# Patient Record
Sex: FEMALE | URBAN - METROPOLITAN AREA
[De-identification: names, ages, dates, MRNs, and addresses within clinical notes are randomized per-mention and may not be internally consistent; named-entity substitution may affect disease eponyms.]

---

## 2017-05-03 ENCOUNTER — EMERGENCY (EMERGENCY)
Facility: HOSPITAL | Age: 32
LOS: 1 days | Discharge: HOME | End: 2017-05-03
Attending: EMERGENCY MEDICINE | Admitting: EMERGENCY MEDICINE
Payer: COMMERCIAL

## 2017-05-03 VITALS
DIASTOLIC BLOOD PRESSURE: 88 MMHG | TEMPERATURE: 98 F | RESPIRATION RATE: 18 BRPM | OXYGEN SATURATION: 97 % | SYSTOLIC BLOOD PRESSURE: 126 MMHG | HEART RATE: 80 BPM

## 2017-05-03 DIAGNOSIS — S09.90XA UNSPECIFIED INJURY OF HEAD, INITIAL ENCOUNTER: ICD-10-CM
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
## 2017-05-03 PROBLEM — Z00.00 ENCOUNTER FOR PREVENTIVE HEALTH EXAMINATION: Status: ACTIVE | Noted: 2017-05-03

## 2017-05-03 PROCEDURE — 99283 EMERGENCY DEPT VISIT LOW MDM: CPT | Mod: 25

## 2017-05-03 PROCEDURE — 12011 RPR F/E/E/N/L/M 2.5 CM/<: CPT

## 2017-05-03 PROCEDURE — 90715 TDAP VACCINE 7 YRS/> IM: CPT

## 2017-05-03 PROCEDURE — 90471 IMMUNIZATION ADMIN: CPT | Mod: XU

## 2017-05-03 RX ORDER — TETANUS TOXOID, REDUCED DIPHTHERIA TOXOID AND ACELLULAR PERTUSSIS VACCINE, ADSORBED 5; 2.5; 8; 8; 2.5 [IU]/.5ML; [IU]/.5ML; UG/.5ML; UG/.5ML; UG/.5ML
0.5 SUSPENSION INTRAMUSCULAR ONCE
Qty: 0 | Refills: 0 | Status: COMPLETED | OUTPATIENT
Start: 2017-05-03 | End: 2017-05-03

## 2017-05-03 RX ADMIN — TETANUS TOXOID, REDUCED DIPHTHERIA TOXOID AND ACELLULAR PERTUSSIS VACCINE, ADSORBED 0.5 MILLILITER(S): 5; 2.5; 8; 8; 2.5 SUSPENSION INTRAMUSCULAR at 22:51

## 2017-05-03 NOTE — ED PROVIDER NOTE - CHPI ED SYMPTOMS NEG
no blurred vision/no numbness/no loss of consciousness/no change in level of consciousness/no dizziness/no confusion/no nausea/no vomiting/no weakness

## 2017-05-03 NOTE — ED ADULT NURSE NOTE - CHPI ED SYMPTOMS NEG
no nausea/no loss of consciousness/no vomiting/no fever/no blurred vision/no dizziness/no confusion/no weakness/no numbness/no change in level of consciousness

## 2017-05-03 NOTE — ED PROVIDER NOTE - OBJECTIVE STATEMENT
32 yo prev healthy s/p head injury after collision w  another player during football. No ams, loc, vomiting since injury and ambulatory. With small superficial laceration below L eyebrow. no neck pain or any other external trauma. No amnesia.

## 2017-05-03 NOTE — ED PROVIDER NOTE - MEDICAL DECISION MAKING DETAILS
No redflags for head trauma at this time, neuro exam wnl pt s/p collision with another player during football, no indication for ct at this time neuro exam wnl, mentating well, pe reassuring. Dermabond used for superficial skin lac, approximating well. return prec, concussion instructions given.

## 2017-05-03 NOTE — ED ADULT TRIAGE NOTE - CHIEF COMPLAINT QUOTE
I was playing football with friends and collided with another player.  I have a small cut over my left eye. Tetanus status unknown.

## 2017-05-03 NOTE — ED ADULT NURSE NOTE - OBJECTIVE STATEMENT
Pt presents to ED A&Ox3. pt states she was playing football when another player collided with her, hitting her right eye. right orbit noted with bruising, 2cm laceration to upper eyelid. pt denies LOC, headache, dizziness, n/v/d, visual disturbances.

## 2017-05-03 NOTE — ED PROVIDER NOTE - PHYSICAL EXAMINATION
CONSTITUTIONAL: Well appearing, well nourished, awake, alert and in no apparent distress.  HEENT: Head is atraumatic. Eyes clear bilaterally, normal EOMI. Airway patent. 1.5cm very superficial lac below L eyebrow, approximating, hemostatic. mild periorbital ecchymosis. no pe findings suggestive of basilar skull fx (hemotympanum, raccoon eyes, otorrhea/rhinorrhea, tee's signs), open/depressed skull fx.   CARDIAC: Normal rate, regular rhythm.  Heart sounds S1, S2.   RESPIRATORY: Breath sounds clear and equal bilaterally.  GASTROINTESTINAL: Abdomen soft, non-tender, no guarding.  MUSCULOSKELETAL: Spine appears normal, range of motion is not limited, no muscle or joint tenderness. no midline c/t/l spine tenderness.  NEUROLOGICAL: Alert and oriented, no focal deficits, no motor or sensory deficits.  SKIN: Skin normal color for race, warm, dry and intact. No evidence of rash.  PSYCHIATRIC: Alert and oriented to person, place, time/situation. normal mood and affect. no apparent risk to self or others.

## 2019-12-27 ENCOUNTER — RESULT REVIEW (OUTPATIENT)
Age: 34
End: 2019-12-27

## 2020-12-02 ENCOUNTER — RESULT REVIEW (OUTPATIENT)
Age: 35
End: 2020-12-02

## 2024-02-22 NOTE — ED ADULT NURSE NOTE - NS ED NURSE LEVEL OF CONSCIOUSNESS SPEECH
Date of Last Office Visit: 1/25/24  Date of Next Office Visit: 3/21/24  No shows since last visit: 0  Cancellations since last visit: 0    Medication requested: diazepam (VALIUM) 10 MG tablet  Date last ordered: 11/30/23 Qty: 20 Refills: 0     Review of [place] ?: Yes  Medication last sold date: 12/8/23 Qty filled: 20  Other controlled substance on [place] ?: Yes  If yes, is this a new medication?: No    Lapse in medication adherence greater than 5 days?: No  If yes, call patient and gather details: na  Medication refill request verified as identical to current order?: yes  Result of Last DAM, VPA, Li+ Level, CBC, or Carbamazepine Level (at or since last visit): N/A    Last visit treatment plan: Increase olanzapine/Zyprexa to 5-10 mg daily as needed for anxiety, sleep, agitation, bipolar disorder.  Continue lamotrigine 200 mg daily for mood (can take as split dose 100 mg twice daily if preferred).  Continue Valium/diazepam 5-10 mg daily as needed for severe anxiety/panic symptoms.  Do not take daily.  15 tabs to last 30 days.  1 time prescription given for 20 tablets recently since went on vacation in early January.  Continue gabapentin 600 mg at bedtime.    Continue Lexapro/escitalopram 20 mg daily for mood, anxiety.  Referral placed for ADHD testing    []Medication refilled per  Medication Refill in Ambulatory Care  policy.  [x]Medication unable to be refilled by RN due to criteria not met as indicated below:    []Eligibility - not seen in the last year   []Supervision - no future appointment   []Compliance - no shows, cancellations or lapse in therapy   []Verification - order discrepancy   [x]Controlled medication   []Medication not included in policy   []90-day supply request   []Other  
Speaking Coherently

## 2024-03-14 NOTE — ED ADULT NURSE NOTE - NS ED NOTE ABUSE SUSPICION NEGLECT YN
Treatment Number: 1 Pulse Duration: 36 Wavelength: 532 nm Spot Size In Mm: 1.0 mm Pulse Duration: 5 Consent: Written consent obtained, risks reviewed including but not limited to crusting, scabbing, blistering, scarring, darker or lighter pigmentary change, systemic reactions, ulceration, incidental hair removal, bruising, and/or incomplete removal. Detail Level: Detailed Endpoint erythema. Vaseline and ice applied. Post care reviewed with patient. External Cooling Fan Speed: 0 Wavelength: 940 nm Energy Density: 19 j/cm2 Post-Care Instructions: I reviewed with the patient in detail post-care instructions. Patient should avoid sunlight and wear sun protection. Energy Density: 900 J/cm2 no